# Patient Record
Sex: FEMALE | Race: ASIAN | Employment: STUDENT | URBAN - METROPOLITAN AREA
[De-identification: names, ages, dates, MRNs, and addresses within clinical notes are randomized per-mention and may not be internally consistent; named-entity substitution may affect disease eponyms.]

---
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
## 2023-01-08 ENCOUNTER — HOSPITAL ENCOUNTER (EMERGENCY)
Facility: HOSPITAL | Age: 18
Discharge: HOME | End: 2023-01-09
Attending: EMERGENCY MEDICINE
Payer: COMMERCIAL

## 2023-01-08 VITALS
HEART RATE: 103 BPM | RESPIRATION RATE: 16 BRPM | SYSTOLIC BLOOD PRESSURE: 149 MMHG | DIASTOLIC BLOOD PRESSURE: 93 MMHG | TEMPERATURE: 100 F | OXYGEN SATURATION: 100 %

## 2023-01-08 DIAGNOSIS — S90.32XA CONTUSION OF LEFT FOOT, INITIAL ENCOUNTER: Primary | ICD-10-CM

## 2023-01-08 PROCEDURE — 99283 EMERGENCY DEPT VISIT LOW MDM: CPT

## 2023-01-09 ENCOUNTER — APPOINTMENT (OUTPATIENT)
Dept: GENERAL RADIOLOGY | Facility: HOSPITAL | Age: 18
End: 2023-01-09
Payer: COMMERCIAL

## 2023-01-09 PROCEDURE — 73630 X-RAY EXAM OF FOOT: CPT | Performed by: EMERGENCY MEDICINE

## (undated) NOTE — ED AVS SNAPSHOT
Babar Celia   MRN: [id number]    Department:  [facility] Emergency Department   Date of Visit:  10/29/2018           Disclosure     Insurance plans vary and the physician(s) referred by the ER may not be covered by your plan.  Please contact y tell this physician (or your personal doctor if your instructions are to return to your personal doctor) about any new or lasting problems. The primary care or specialist physician will see patients referred from the [facility] Emergency Department.  Aneta Nance

## (undated) NOTE — LETTER
Date & Time: 10/30/2018, 12:06 AM  Patient: Ady Bellamy  Encounter Provider(s):    Nathanael Tobin MD       To Whom It May Concern:    Ady Bellamy was seen and treated in our department on 10/29/2018.  She should not participate in gym/sports

## (undated) NOTE — LETTER
Date & Time: 10/30/2018, 12:02 AM  Patient: Lucinda Dye  Encounter Provider(s):    Praful Willis MD       To Whom It May Concern:    Lucinda Dye was seen and treated in our department on 10/29/2018.  She should not participate in gym/sports

## (undated) NOTE — LETTER
02/01/18    Ethel Jones      To Whom It May Concern:     This letter has been written at the patient's request. The above patient was seen at [facility] for treatment of a medical condition from 01/31/18 - 02/01/18    The patient may return to wor